# Patient Record
Sex: MALE | Race: WHITE | Employment: FULL TIME | URBAN - METROPOLITAN AREA
[De-identification: names, ages, dates, MRNs, and addresses within clinical notes are randomized per-mention and may not be internally consistent; named-entity substitution may affect disease eponyms.]

---

## 2017-12-19 ENCOUNTER — HOSPITAL ENCOUNTER (OUTPATIENT)
Dept: OTHER | Age: 32
Discharge: HOME | End: 2017-12-19
Attending: NURSE PRACTITIONER | Admitting: NURSE PRACTITIONER

## 2017-12-19 ENCOUNTER — OFFICE VISIT (OUTPATIENT)
Dept: INTERNAL MEDICINE | Age: 32
End: 2017-12-19

## 2017-12-19 VITALS
DIASTOLIC BLOOD PRESSURE: 76 MMHG | WEIGHT: 174 LBS | SYSTOLIC BLOOD PRESSURE: 120 MMHG | HEART RATE: 76 BPM | OXYGEN SATURATION: 99 %

## 2017-12-19 DIAGNOSIS — M25.552 PAIN OF LEFT HIP JOINT: ICD-10-CM

## 2017-12-19 DIAGNOSIS — M25.562 ACUTE PAIN OF LEFT KNEE: ICD-10-CM

## 2017-12-19 DIAGNOSIS — M25.552 PAIN OF LEFT HIP JOINT: Primary | ICD-10-CM

## 2017-12-19 PROCEDURE — 99202 OFFICE O/P NEW SF 15 MIN: CPT | Performed by: NURSE PRACTITIONER

## 2017-12-19 ASSESSMENT — ENCOUNTER SYMPTOMS: BACK PAIN: 0

## 2017-12-19 NOTE — PROGRESS NOTES
exhibits no swelling, no effusion, no erythema, normal meniscus and no MCL laxity. Tenderness found. Skin: Skin is warm and dry. No current outpatient prescriptions on file. No current facility-administered medications for this visit. Assessment:     1. Pain of left hip joint    2. Acute pain of left knee      Plan:   1. Pain of left hip joint  Check images  - XR HIP 2-3 VW W PELVIS LEFT; Future    2. Acute pain of left knee    - XR KNEE LEFT (3 VIEWS); Future        Discussed use, benefit, and side effects of all prescribed medications. Barriers to medication compliance addressed. All patient questions answered. Pt voiced understanding. All patient questions answered. Pt voiced understanding.

## 2019-07-24 ENCOUNTER — OFFICE VISIT (OUTPATIENT)
Dept: FAMILY MEDICINE CLINIC | Age: 34
End: 2019-07-24

## 2019-07-24 VITALS
WEIGHT: 168 LBS | DIASTOLIC BLOOD PRESSURE: 72 MMHG | HEART RATE: 76 BPM | HEIGHT: 69 IN | SYSTOLIC BLOOD PRESSURE: 128 MMHG | RESPIRATION RATE: 14 BRPM | BODY MASS INDEX: 24.88 KG/M2 | OXYGEN SATURATION: 97 %

## 2019-07-24 DIAGNOSIS — Z72.0 TOBACCO USE: ICD-10-CM

## 2019-07-24 DIAGNOSIS — Z09 HOSPITAL DISCHARGE FOLLOW-UP: Primary | ICD-10-CM

## 2019-07-24 DIAGNOSIS — Z00.00 ANNUAL PHYSICAL EXAM: Primary | ICD-10-CM

## 2019-07-24 PROCEDURE — 99495 TRANSJ CARE MGMT MOD F2F 14D: CPT | Performed by: FAMILY MEDICINE

## 2019-07-24 RX ORDER — VARENICLINE TARTRATE 25 MG
KIT ORAL
Qty: 1 BOX | Refills: 0 | Status: SHIPPED | OUTPATIENT
Start: 2019-07-24

## 2019-07-24 RX ORDER — ALBUTEROL SULFATE 2.5 MG/3ML
2.5 SOLUTION RESPIRATORY (INHALATION) EVERY 6 HOURS PRN
COMMUNITY

## 2019-07-24 ASSESSMENT — ENCOUNTER SYMPTOMS
VOMITING: 0
SHORTNESS OF BREATH: 0
COLOR CHANGE: 1
COUGH: 1

## 2019-08-15 ENCOUNTER — TELEPHONE (OUTPATIENT)
Dept: FAMILY MEDICINE CLINIC | Age: 34
End: 2019-08-15

## 2024-07-15 SDOH — HEALTH STABILITY: PHYSICAL HEALTH: ON AVERAGE, HOW MANY DAYS PER WEEK DO YOU ENGAGE IN MODERATE TO STRENUOUS EXERCISE (LIKE A BRISK WALK)?: 7 DAYS

## 2024-07-15 SDOH — HEALTH STABILITY: PHYSICAL HEALTH: ON AVERAGE, HOW MANY MINUTES DO YOU ENGAGE IN EXERCISE AT THIS LEVEL?: 60 MIN

## 2024-07-16 ENCOUNTER — OFFICE VISIT (OUTPATIENT)
Dept: PRIMARY CARE CLINIC | Age: 39
End: 2024-07-16
Payer: COMMERCIAL

## 2024-07-16 ENCOUNTER — HOSPITAL ENCOUNTER (OUTPATIENT)
Dept: GENERAL RADIOLOGY | Age: 39
Discharge: HOME | End: 2024-07-16
Payer: COMMERCIAL

## 2024-07-16 ENCOUNTER — HOSPITAL ENCOUNTER (OUTPATIENT)
Age: 39
Discharge: HOME | End: 2024-07-16
Payer: COMMERCIAL

## 2024-07-16 VITALS
WEIGHT: 168 LBS | DIASTOLIC BLOOD PRESSURE: 82 MMHG | OXYGEN SATURATION: 98 % | BODY MASS INDEX: 24.88 KG/M2 | HEIGHT: 69 IN | HEART RATE: 67 BPM | RESPIRATION RATE: 14 BRPM | TEMPERATURE: 98 F | SYSTOLIC BLOOD PRESSURE: 132 MMHG

## 2024-07-16 DIAGNOSIS — S29.9XXA RIB INJURY: ICD-10-CM

## 2024-07-16 DIAGNOSIS — Z13.1 DIABETES MELLITUS SCREENING: ICD-10-CM

## 2024-07-16 DIAGNOSIS — F17.200 CURRENT EVERY DAY SMOKER: ICD-10-CM

## 2024-07-16 DIAGNOSIS — Z13.220 LIPID SCREENING: ICD-10-CM

## 2024-07-16 DIAGNOSIS — Z83.3 FAMILY HISTORY OF DIABETES MELLITUS: ICD-10-CM

## 2024-07-16 DIAGNOSIS — Z76.89 ENCOUNTER TO ESTABLISH CARE: Primary | ICD-10-CM

## 2024-07-16 DIAGNOSIS — Z23 NEED FOR TDAP VACCINATION: ICD-10-CM

## 2024-07-16 PROCEDURE — 36415 COLL VENOUS BLD VENIPUNCTURE: CPT

## 2024-07-16 PROCEDURE — 90715 TDAP VACCINE 7 YRS/> IM: CPT

## 2024-07-16 PROCEDURE — 99203 OFFICE O/P NEW LOW 30 MIN: CPT

## 2024-07-16 PROCEDURE — 71101 X-RAY EXAM UNILAT RIBS/CHEST: CPT

## 2024-07-16 PROCEDURE — 90471 IMMUNIZATION ADMIN: CPT

## 2024-07-16 RX ORDER — IBUPROFEN 800 MG/1
800 TABLET ORAL EVERY 8 HOURS PRN
Qty: 30 TABLET | Refills: 1 | Status: SHIPPED | OUTPATIENT
Start: 2024-07-16

## 2024-07-16 RX ORDER — VARENICLINE TARTRATE 0.5 MG/1
.5-1 TABLET, FILM COATED ORAL SEE ADMIN INSTRUCTIONS
Qty: 57 TABLET | Refills: 0 | Status: SHIPPED | OUTPATIENT
Start: 2024-07-16

## 2024-07-16 SDOH — ECONOMIC STABILITY: FOOD INSECURITY: WITHIN THE PAST 12 MONTHS, THE FOOD YOU BOUGHT JUST DIDN'T LAST AND YOU DIDN'T HAVE MONEY TO GET MORE.: NEVER TRUE

## 2024-07-16 SDOH — ECONOMIC STABILITY: HOUSING INSECURITY
IN THE LAST 12 MONTHS, WAS THERE A TIME WHEN YOU DID NOT HAVE A STEADY PLACE TO SLEEP OR SLEPT IN A SHELTER (INCLUDING NOW)?: NO

## 2024-07-16 SDOH — ECONOMIC STABILITY: INCOME INSECURITY: HOW HARD IS IT FOR YOU TO PAY FOR THE VERY BASICS LIKE FOOD, HOUSING, MEDICAL CARE, AND HEATING?: NOT VERY HARD

## 2024-07-16 SDOH — ECONOMIC STABILITY: FOOD INSECURITY: WITHIN THE PAST 12 MONTHS, YOU WORRIED THAT YOUR FOOD WOULD RUN OUT BEFORE YOU GOT MONEY TO BUY MORE.: NEVER TRUE

## 2024-07-16 ASSESSMENT — ENCOUNTER SYMPTOMS
VOMITING: 0
COUGH: 0
NAUSEA: 0
CONSTIPATION: 0
SHORTNESS OF BREATH: 0

## 2024-07-16 ASSESSMENT — PATIENT HEALTH QUESTIONNAIRE - PHQ9
SUM OF ALL RESPONSES TO PHQ QUESTIONS 1-9: 0
1. LITTLE INTEREST OR PLEASURE IN DOING THINGS: NOT AT ALL
SUM OF ALL RESPONSES TO PHQ QUESTIONS 1-9: 0
2. FEELING DOWN, DEPRESSED OR HOPELESS: NOT AT ALL
SUM OF ALL RESPONSES TO PHQ9 QUESTIONS 1 & 2: 0
SUM OF ALL RESPONSES TO PHQ QUESTIONS 1-9: 0
SUM OF ALL RESPONSES TO PHQ QUESTIONS 1-9: 0

## 2024-07-16 NOTE — PROGRESS NOTES
[facility]  Establish care visit   2024    Tomas Aguilar (:  1985) is a 39 y.o. male, here to establish care.    Chief Complaint   Patient presents with    New Patient    Nicotine Dependence    Rib Injury     Possible rib fracture on left side, 2024 rolled a gocart        ASSESSMENT/ PLAN  1. Encounter to establish care  - Lipid Panel  - Hemoglobin A1C  - CBC with Auto Differential  - Comprehensive Metabolic Panel    2. Rib injury  - XR RIBS LEFT INCLUDE CHEST (MIN 3 VIEWS); Future  - ibuprofen (ADVIL;MOTRIN) 800 MG tablet; Take 1 tablet by mouth every 8 hours as needed for Pain  Dispense: 30 tablet; Refill: 1    3. Current every day smoker  - varenicline (CHANTIX) 0.5 MG tablet; Take 1-2 tablets by mouth See Admin Instructions 0.5mg DAILY for 3 days followed by 0.5mg TWICE DAILY for 4 days followed by 1mg TWICE DAILY  Dispense: 57 tablet; Refill: 0    4. Lipid screening  - Lipid Panel    5. Diabetes mellitus screening  - Hemoglobin A1C    6. Family history of diabetes mellitus  - Hemoglobin A1C    7. Need for Tdap vaccination  - Tdap, BOOSTRIX, (age 10 yrs+), IM       - Offered information about smoking cessation classes, but he was not interested.  - Discussed supportive care measures for the rib pain. I don't see any red flag symptoms at this time.    Return in about 3 months (around 10/16/2024).    HPI  Here today to establish care. He is here with his wife, Lynn. Last saw Dr. Jeffers in 2019. States that he doesn't like going to the doctor.  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  Grew up in [place]. Did not graduate from high school; \"just wasn't for me.\"  Enjoys fishing.   He has 3 children from a different relationship. Ages 21, 19, and 17.    Denies any chronic health conditions. However, has not had regular follow-up visits.  Drinks Mt Dew daily. \"That's all he drinks.\"   Snacks on sweets and chocolate.  Weight has been stable.    Long

## 2024-07-16 NOTE — PATIENT INSTRUCTIONS
Ibuprofen every 8 hours as needed for pain  Topical Ice  Can use a pillow or ACE wrap as needed to help bind the area

## 2024-07-17 LAB
ALBUMIN SERPL-MCNC: 4.7 G/DL (ref 3.4–5)
ALBUMIN/GLOB SERPL: 1.7 {RATIO} (ref 1.1–2.2)
ALP SERPL-CCNC: 128 U/L (ref 40–129)
ALT SERPL-CCNC: 17 U/L (ref 10–40)
ANION GAP SERPL CALCULATED.3IONS-SCNC: 9 MMOL/L (ref 3–16)
AST SERPL-CCNC: 23 U/L (ref 15–37)
BASOPHILS # BLD: 0 K/UL (ref 0–0.2)
BASOPHILS NFR BLD: 0.4 %
BILIRUB SERPL-MCNC: 0.3 MG/DL (ref 0–1)
BUN SERPL-MCNC: 15 MG/DL (ref 7–20)
CALCIUM SERPL-MCNC: 9.9 MG/DL (ref 8.3–10.6)
CHLORIDE SERPL-SCNC: 104 MMOL/L (ref 99–110)
CHOLEST SERPL-MCNC: 152 MG/DL (ref 0–199)
CO2 SERPL-SCNC: 28 MMOL/L (ref 21–32)
CREAT SERPL-MCNC: 0.9 MG/DL (ref 0.9–1.3)
DEPRECATED RDW RBC AUTO: 15.5 % (ref 12.4–15.4)
EOSINOPHIL # BLD: 0.2 K/UL (ref 0–0.6)
EOSINOPHIL NFR BLD: 1.8 %
EST. AVERAGE GLUCOSE BLD GHB EST-MCNC: 111.2 MG/DL
GFR SERPLBLD CREATININE-BSD FMLA CKD-EPI: >90 ML/MIN/{1.73_M2}
GLUCOSE SERPL-MCNC: 94 MG/DL (ref 70–99)
HBA1C MFR BLD: 5.5 %
HCT VFR BLD AUTO: 44.6 % (ref 40.5–52.5)
HDLC SERPL-MCNC: 31 MG/DL (ref 40–60)
HGB BLD-MCNC: 14.1 G/DL (ref 13.5–17.5)
LDLC SERPL CALC-MCNC: 97 MG/DL
LYMPHOCYTES # BLD: 3.1 K/UL (ref 1–5.1)
LYMPHOCYTES NFR BLD: 30.3 %
MCH RBC QN AUTO: 24.9 PG (ref 26–34)
MCHC RBC AUTO-ENTMCNC: 31.7 G/DL (ref 31–36)
MCV RBC AUTO: 78.4 FL (ref 80–100)
MONOCYTES # BLD: 0.6 K/UL (ref 0–1.3)
MONOCYTES NFR BLD: 6.2 %
NEUTROPHILS # BLD: 6.2 K/UL (ref 1.7–7.7)
NEUTROPHILS NFR BLD: 61.3 %
PLATELET # BLD AUTO: 225 K/UL (ref 135–450)
PMV BLD AUTO: 10.6 FL (ref 5–10.5)
POTASSIUM SERPL-SCNC: 4.5 MMOL/L (ref 3.5–5.1)
PROT SERPL-MCNC: 7.4 G/DL (ref 6.4–8.2)
RBC # BLD AUTO: 5.68 M/UL (ref 4.2–5.9)
SODIUM SERPL-SCNC: 141 MMOL/L (ref 136–145)
TRIGL SERPL-MCNC: 119 MG/DL (ref 0–150)
VLDLC SERPL CALC-MCNC: 24 MG/DL
WBC # BLD AUTO: 10.1 K/UL (ref 4–11)

## 2024-07-26 DIAGNOSIS — D50.9 IRON DEFICIENCY ANEMIA, UNSPECIFIED IRON DEFICIENCY ANEMIA TYPE: Primary | ICD-10-CM

## 2024-07-26 RX ORDER — FERROUS SULFATE 325(65) MG
325 TABLET ORAL
Qty: 90 TABLET | Refills: 1 | Status: SHIPPED | OUTPATIENT
Start: 2024-07-26

## 2024-08-12 DIAGNOSIS — F17.200 CURRENT EVERY DAY SMOKER: Primary | ICD-10-CM

## 2024-08-12 RX ORDER — VARENICLINE TARTRATE 1 MG/1
1 TABLET, FILM COATED ORAL 2 TIMES DAILY
Qty: 60 TABLET | Refills: 5 | Status: SHIPPED | OUTPATIENT
Start: 2024-08-12

## 2024-08-17 ENCOUNTER — HOSPITAL ENCOUNTER (EMERGENCY)
Age: 39
Discharge: HOME | End: 2024-08-17
Payer: COMMERCIAL

## 2024-08-17 VITALS
SYSTOLIC BLOOD PRESSURE: 122 MMHG | RESPIRATION RATE: 16 BRPM | BODY MASS INDEX: 25.18 KG/M2 | TEMPERATURE: 96.9 F | DIASTOLIC BLOOD PRESSURE: 74 MMHG | OXYGEN SATURATION: 100 % | HEIGHT: 69 IN | HEART RATE: 67 BPM | WEIGHT: 170 LBS

## 2024-08-17 DIAGNOSIS — H11.32 SUBCONJUNCTIVAL HEMORRHAGE, LEFT: ICD-10-CM

## 2024-08-17 DIAGNOSIS — S05.92XA LEFT EYE INJURY, INITIAL ENCOUNTER: Primary | ICD-10-CM

## 2024-08-17 PROCEDURE — 99283 EMERGENCY DEPT VISIT LOW MDM: CPT

## 2024-08-17 RX ORDER — ERYTHROMYCIN 5 MG/G
OINTMENT OPHTHALMIC EVERY 6 HOURS
Qty: 3.5 G | Refills: 0 | Status: SHIPPED | OUTPATIENT
Start: 2024-08-17 | End: 2024-08-22

## 2024-08-17 ASSESSMENT — PAIN - FUNCTIONAL ASSESSMENT: PAIN_FUNCTIONAL_ASSESSMENT: NONE - DENIES PAIN

## 2024-08-17 ASSESSMENT — VISUAL ACUITY
OS: 20/25
OD: 20/25
OU: 20/25

## 2024-08-17 NOTE — DISCHARGE INSTRUCTIONS
Continue the antibiotic eyedrops in addition to artificial tears over-the-counter until you are reevaluated with ophthalmology on Monday morning

## 2024-08-17 NOTE — ED PROVIDER NOTES
No focal deficit present.      Mental Status: He is alert and oriented to person, place, and time. Mental status is at baseline.   Psychiatric:         Mood and Affect: Mood normal.         Behavior: Behavior normal.         Thought Content: Thought content normal.         Judgment: Judgment normal.           DIAGNOSTIC RESULTS   LABS:    Labs Reviewed - No data to display    When ordered only abnormal lab results are displayed. All other labs were within normal range or not returned as of this dictation.    EKG: When ordered, EKG's are interpreted by the Emergency Department Physician in the absence of a cardiologist.  Please see their note for interpretation of EKG.    RADIOLOGY:   Non-plain film images such as CT, Ultrasound and MRI are read by the radiologist. Plain radiographic images are visualized and preliminarily interpreted by the ED Provider with the below findings:          Interpretation per the Radiologist below, if available at the time of this note:    No orders to display     No results found.     No results found.    PROCEDURES   Unless otherwise noted below, none     Procedures    CRITICAL CARE TIME (.cctime)         EMERGENCY DEPARTMENT COURSE and DIFFERENTIAL DIAGNOSIS/MDM:   Vitals:    Vitals:    08/17/24 1122   BP: 128/74   Pulse: 64   Resp: 16   Temp: 96.9 °F (36.1 °C)   TempSrc: Oral   SpO2: 100%   Weight: 77.1 kg (170 lb)   Height: 1.753 m (5' 9\")       Patient was given the following medications:  Medications - No data to display          [unfilled]    Chronic Conditions affecting care:    has a past medical history of Chronic back pain, Emphysema of lung (HCC), GERD (gastroesophageal reflux disease), Headache, Hypertension, and Restless legs syndrome.    CONSULTS: (Who and What was discussed)  IP CONSULT TO OPHTHALMOLOGY      Records Reviewed (External and Source)     CC/HPI Summary, DDx, ED Course, and Reassessment: This is a 39-year-old history of GERD hypertension restless legs  emphysema coming in with left eye trauma.  Clinically he has a significant subconjunctival hemorrhage.  He has intact visual acuity.  Unfortunately did not have a Alan-Pen that is appropriately working in the ER.  I am concerned on exam that he may have a small subconjunctival laceration.  He otherwise has no pain or visual acuity issues, 20/25 both eyes so I feel that a globe rupture is less consistent.  He has no uptake with fluorescein dye around the subconjunctival hemorrhage on exam.  No hyphema.  I do have a consult out to [facility].  Up-to-date on tetanus.    I spoke to ophthalmology, they would like the patient to be placed on artificial tears and erythromycin ointment and be reevaluated Monday morning.  I gave the patient strict return precautions.  We were able to find a working Alan-Pen in the hospital and a measure of the pressure in both eyes, 18 on the left and 17 on the right.  I reassured him and he is comfortable with discharge at this time    The patient tolerated their visit well.  The patient and / or the family were informed of the results of any tests, a time was given to answer questions, a plan was proposed and they agreed with plan.    I am the Primary Clinician of Record.  FINAL IMPRESSION      1. Left eye injury, initial encounter    2. Subconjunctival hemorrhage, left          DISPOSITION/PLAN     DISPOSITION Decision To Discharge 08/17/2024 12:53:24 PM  Condition at Disposition: Good      PATIENT REFERRED TO:  [facility]  [address]  [facility] [address]  [phone]    Call in 2 days  call [phone] to get in for a reevaluation Monday morning    [facility]  [address]  [facility] [address] [phone]  Go to   Vision changes increased pain      DISCHARGE MEDICATIONS:  New Prescriptions    ERYTHROMYCIN (ROMYCIN) 5 MG/GM OPHTHALMIC OINTMENT    Place into the left eye every 6 hours for 5 days       DISCONTINUED

## 2024-08-17 NOTE — ED NOTES
Call placed to [facility]  1236  Call was returned and spoke to austin Maldonado

## 2024-11-22 ENCOUNTER — APPOINTMENT (OUTPATIENT)
Dept: CT IMAGING | Age: 39
End: 2024-11-22
Payer: COMMERCIAL

## 2024-11-22 ENCOUNTER — HOSPITAL ENCOUNTER (EMERGENCY)
Age: 39
Discharge: HOME | End: 2024-11-22
Payer: COMMERCIAL

## 2024-11-22 VITALS
BODY MASS INDEX: 25.18 KG/M2 | TEMPERATURE: 97.4 F | SYSTOLIC BLOOD PRESSURE: 135 MMHG | HEART RATE: 60 BPM | HEIGHT: 69 IN | RESPIRATION RATE: 18 BRPM | WEIGHT: 170 LBS | OXYGEN SATURATION: 99 % | DIASTOLIC BLOOD PRESSURE: 74 MMHG

## 2024-11-22 DIAGNOSIS — R09.A2 GLOBUS SENSATION: Primary | ICD-10-CM

## 2024-11-22 LAB
ALBUMIN SERPL-MCNC: 4.4 G/DL (ref 3.4–5)
ALBUMIN/GLOB SERPL: 1.5 {RATIO} (ref 1.1–2.2)
ALP SERPL-CCNC: 123 U/L (ref 40–129)
ALT SERPL-CCNC: 28 U/L (ref 10–40)
ANION GAP SERPL CALCULATED.3IONS-SCNC: 11 MMOL/L (ref 3–16)
AST SERPL-CCNC: 29 U/L (ref 15–37)
BASOPHILS # BLD: 0.1 K/UL (ref 0–0.2)
BASOPHILS NFR BLD: 0.4 %
BILIRUB SERPL-MCNC: 0.4 MG/DL (ref 0–1)
BUN SERPL-MCNC: 3 MG/DL (ref 7–20)
CALCIUM SERPL-MCNC: 9.1 MG/DL (ref 8.3–10.6)
CHLORIDE SERPL-SCNC: 105 MMOL/L (ref 99–110)
CO2 SERPL-SCNC: 25 MMOL/L (ref 21–32)
CREAT SERPL-MCNC: 0.9 MG/DL (ref 0.9–1.3)
DEPRECATED RDW RBC AUTO: 14 % (ref 12.4–15.4)
EOSINOPHIL # BLD: 0.1 K/UL (ref 0–0.6)
EOSINOPHIL NFR BLD: 0.6 %
GFR SERPLBLD CREATININE-BSD FMLA CKD-EPI: >90 ML/MIN/{1.73_M2}
GLUCOSE SERPL-MCNC: 120 MG/DL (ref 70–99)
HCT VFR BLD AUTO: 42.8 % (ref 40.5–52.5)
HGB BLD-MCNC: 14 G/DL (ref 13.5–17.5)
LYMPHOCYTES # BLD: 1.9 K/UL (ref 1–5.1)
LYMPHOCYTES NFR BLD: 14.2 %
MCH RBC QN AUTO: 25.1 PG (ref 26–34)
MCHC RBC AUTO-ENTMCNC: 32.6 G/DL (ref 31–36)
MCV RBC AUTO: 77.1 FL (ref 80–100)
MONOCYTES # BLD: 0.8 K/UL (ref 0–1.3)
MONOCYTES NFR BLD: 6.4 %
NEUTROPHILS # BLD: 10.3 K/UL (ref 1.7–7.7)
NEUTROPHILS NFR BLD: 78.4 %
PLATELET # BLD AUTO: 232 K/UL (ref 135–450)
PMV BLD AUTO: 9.7 FL (ref 5–10.5)
POTASSIUM SERPL-SCNC: 3.8 MMOL/L (ref 3.5–5.1)
PROT SERPL-MCNC: 7.4 G/DL (ref 6.4–8.2)
RBC # BLD AUTO: 5.55 M/UL (ref 4.2–5.9)
SODIUM SERPL-SCNC: 141 MMOL/L (ref 136–145)
WBC # BLD AUTO: 13.1 K/UL (ref 4–11)

## 2024-11-22 PROCEDURE — 85025 COMPLETE CBC W/AUTO DIFF WBC: CPT

## 2024-11-22 PROCEDURE — 70491 CT SOFT TISSUE NECK W/DYE: CPT

## 2024-11-22 PROCEDURE — 6370000000 HC RX 637 (ALT 250 FOR IP): Performed by: NURSE PRACTITIONER

## 2024-11-22 PROCEDURE — 80053 COMPREHEN METABOLIC PANEL: CPT

## 2024-11-22 PROCEDURE — 99285 EMERGENCY DEPT VISIT HI MDM: CPT

## 2024-11-22 PROCEDURE — 6360000004 HC RX CONTRAST MEDICATION: Performed by: NURSE PRACTITIONER

## 2024-11-22 RX ORDER — IOPAMIDOL 755 MG/ML
75 INJECTION, SOLUTION INTRAVASCULAR
Status: COMPLETED | OUTPATIENT
Start: 2024-11-22 | End: 2024-11-22

## 2024-11-22 RX ORDER — PANTOPRAZOLE SODIUM 40 MG/1
40 TABLET, DELAYED RELEASE ORAL ONCE
Status: COMPLETED | OUTPATIENT
Start: 2024-11-22 | End: 2024-11-22

## 2024-11-22 RX ORDER — FLUTICASONE PROPIONATE 50 MCG
2 SPRAY, SUSPENSION (ML) NASAL DAILY
Qty: 16 G | Refills: 0 | Status: SHIPPED | OUTPATIENT
Start: 2024-11-22

## 2024-11-22 RX ORDER — PANTOPRAZOLE SODIUM 20 MG/1
20 TABLET, DELAYED RELEASE ORAL DAILY
Qty: 30 TABLET | Refills: 3 | Status: SHIPPED | OUTPATIENT
Start: 2024-11-22

## 2024-11-22 RX ADMIN — IOPAMIDOL 75 ML: 755 INJECTION, SOLUTION INTRAVENOUS at 21:24

## 2024-11-22 RX ADMIN — PANTOPRAZOLE SODIUM 40 MG: 40 TABLET, DELAYED RELEASE ORAL at 21:58

## 2024-11-22 ASSESSMENT — PAIN - FUNCTIONAL ASSESSMENT: PAIN_FUNCTIONAL_ASSESSMENT: NONE - DENIES PAIN

## 2024-11-23 NOTE — ED PROVIDER NOTES
[facility] ED  EMERGENCY DEPARTMENT ENCOUNTER        Pt Name: Tomas Aguilar  MRN: [id number]  Birthdate 1985  Date of evaluation: 11/22/2024  Provider: SEGUNDO Latham CNP  PCP: Lucila Nelson APRN - CNP  Note Started: 9:27 PM EST 11/22/24      DELL. I have evaluated this patient.        CHIEF COMPLAINT       Chief Complaint   Patient presents with    Shortness of Breath     States congested, vomiting last night, sore throat. For a couple days     Nausea    Cough    Pharyngitis       HISTORY OF PRESENT ILLNESS: 1 or more Elements     History From: Patient     Limitations to history : None    Social Determinants Significantly Affecting Health : None    Chief Complaint: Painful swallowing    Tomas Aguilar is a 39 y.o. male who presents to the emergency department today with symptoms of painful swallowing.  States that symptoms have been ongoing now for the last few weeks.  States that over the last couple days seem to get worse.  No neck or back pain.  Occasional cough.  No other acute concerns.    Nursing Notes were all reviewed and agreed with or any disagreements were addressed in the HPI.    REVIEW OF SYSTEMS :      Review of Systems    Positives and Pertinent negatives as per HPI.     SURGICAL HISTORY   History reviewed. No pertinent surgical history.    CURRENTMEDICATIONS       Discharge Medication List as of 11/22/2024 10:00 PM        CONTINUE these medications which have NOT CHANGED    Details   varenicline (CHANTIX) 1 MG tablet Take 1 tablet by mouth 2 times daily, Disp-60 tablet, R-5Normal      ferrous sulfate (IRON 325) 325 (65 Fe) MG tablet Take 1 tablet by mouth daily (with breakfast), Disp-90 tablet, R-1Normal      ibuprofen (ADVIL;MOTRIN) 800 MG tablet Take 1 tablet by mouth every 8 hours as needed for Pain, Disp-30 tablet, R-1Normal             ALLERGIES     Patient has no known allergies.    FAMILYHISTORY       Family History   Problem Relation Age of Onset     11/22/24 2008 11/22/24 2200   BP: (!) 149/84  135/74   Pulse: 66  60   Resp: 18     Temp: 97.4 °F (36.3 °C)     TempSrc: Oral     SpO2: 98%  99%   Weight:  77.1 kg (170 lb)    Height:  1.753 m (5' 9\")        Patient was given the following medications:  Medications   iopamidol (ISOVUE-370) 76 % injection 75 mL (75 mLs IntraVENous Given 11/22/24 2124)   pantoprazole (PROTONIX) tablet 40 mg (40 mg Oral Given 11/22/24 2158)             Is this patient to be included in the SEP-1 Core Measure due to severe sepsis or septic shock?   No   Exclusion criteria - the patient is NOT to be included for SEP-1 Core Measure due to:  2+ SIRS criteria are not met      Chronic Conditions affecting care:    has a past medical history of Chronic back pain, Emphysema of lung (HCC), GERD (gastroesophageal reflux disease), Headache, Hypertension, and Restless legs syndrome.    CONSULTS: (Who and What was discussed)  None      Records Reviewed (External and Source)     CC/HPI Summary, DDx, ED Course, and Reassessment: Patient is not having difficulty in swallowing.  Is able to provide us a swallow trial in the emergency department.  CT of the soft tissue's neck was negative.  Vital signs are stable.  Laboratory finds are unremarkable.  He looks well in no acute distress.  At this time I encouraged him follow-up with GI.  Encouraged him strict return precautions back to [facility] part for any further acute concerns.  He verbalized understanding agrees with treatment plan discharge.    Disposition Considerations (tests considered but not done, Admit vs D/C, Shared Decision Making, Pt Expectation of Test or Tx.):      The patient tolerated their visit well.  The patient and / or the family were informed of the results of any tests, a time was given to answer questions, a plan was proposed and they agreed with plan.    I am the Primary Clinician of Record.  FINAL IMPRESSION      1. Globus sensation          DISPOSITION/PLAN     DISPOSITION